# Patient Record
Sex: FEMALE
[De-identification: names, ages, dates, MRNs, and addresses within clinical notes are randomized per-mention and may not be internally consistent; named-entity substitution may affect disease eponyms.]

---

## 2020-07-23 PROBLEM — Z00.00 ENCOUNTER FOR PREVENTIVE HEALTH EXAMINATION: Status: ACTIVE | Noted: 2020-07-23

## 2020-07-30 ENCOUNTER — APPOINTMENT (OUTPATIENT)
Dept: ORTHOPEDIC SURGERY | Facility: CLINIC | Age: 60
End: 2020-07-30
Payer: COMMERCIAL

## 2020-07-30 ENCOUNTER — OUTPATIENT (OUTPATIENT)
Dept: OUTPATIENT SERVICES | Facility: HOSPITAL | Age: 60
LOS: 1 days | End: 2020-07-30
Payer: COMMERCIAL

## 2020-07-30 VITALS
WEIGHT: 183 LBS | OXYGEN SATURATION: 98 % | HEART RATE: 85 BPM | DIASTOLIC BLOOD PRESSURE: 80 MMHG | BODY MASS INDEX: 33.68 KG/M2 | HEIGHT: 62 IN | SYSTOLIC BLOOD PRESSURE: 112 MMHG

## 2020-07-30 DIAGNOSIS — M76.892 OTHER SPECIFIED ENTHESOPATHIES OF LEFT LOWER LIMB, EXCLUDING FOOT: ICD-10-CM

## 2020-07-30 DIAGNOSIS — Z86.19 PERSONAL HISTORY OF OTHER INFECTIOUS AND PARASITIC DISEASES: ICD-10-CM

## 2020-07-30 DIAGNOSIS — R73.03 PREDIABETES.: ICD-10-CM

## 2020-07-30 DIAGNOSIS — Z82.61 FAMILY HISTORY OF ARTHRITIS: ICD-10-CM

## 2020-07-30 PROCEDURE — 73564 X-RAY EXAM KNEE 4 OR MORE: CPT

## 2020-07-30 PROCEDURE — 73564 X-RAY EXAM KNEE 4 OR MORE: CPT | Mod: 26,LT,RT

## 2020-07-30 PROCEDURE — 99204 OFFICE O/P NEW MOD 45 MIN: CPT

## 2020-07-30 RX ORDER — CELECOXIB 100 MG/1
100 CAPSULE ORAL TWICE DAILY
Qty: 60 | Refills: 1 | Status: ACTIVE | COMMUNITY
Start: 2020-07-30 | End: 1900-01-01

## 2020-07-30 RX ORDER — OMEPRAZOLE 40 MG/1
40 CAPSULE, DELAYED RELEASE ORAL
Refills: 0 | Status: ACTIVE | COMMUNITY

## 2020-07-30 RX ORDER — DICLOFENAC SODIUM 10 MG/G
1 GEL TOPICAL
Refills: 0 | Status: ACTIVE | COMMUNITY

## 2020-07-30 RX ORDER — CHROMIUM 200 MCG
TABLET ORAL
Refills: 0 | Status: ACTIVE | COMMUNITY

## 2020-07-30 NOTE — DISCUSSION/SUMMARY
[Medication Risks Reviewed] : Medication risks reviewed [de-identified] : The patient is a 69 year old woman with history of H. pylori infection, gastritis presenting with a one month history of atraumatic, left knee pain.  Her symptoms are consistent with mild, bilateral knee osteoarthritis, and left knee distal quadriceps tendinitis.\par \par Imaging/Diagnostics were interpreted and results were reviewed with the patient in detail.  All questions were answered appropriately.\par \par The patient was counseled on the natural progression of the problem today.  The patient was provided reassurance today.\par \par Patient was prescribed a course of physical therapy today.  The patient was also provided some general home exercises.  The patient was counseled on activity modification.\par \par Patient was prescribed a short course of Celecoxib .Appropriate use of medication was reviewed with the patient in detail. Risks, benefits, and adverse effects medication were discussed.  It is medically necessary to prescript OSBORN-2 selective NSAID given history of gastritis.  Patient instructed to continue to take PPI, Omeprazole.\par \par Follow-up in 4-6 weeks after PT.  If symptoms persist, will consider MRI vs. injections - cortisone vs. HA.\par \par \par ------------------------------------------------------------------------------------------------------------------\par Patient appreciates and agrees with current plan.\par \par This note was generated using a mixture of manual typing and dragon medical dictation software.  A reasonable effort has been made for proofreading its contents, but typos may still remain.  If there are any questions or points of clarification needed please notify my office.\par \par >45 minutes of time was spent on total encounter.  >50% of the visit was spent on counseling/coordination of care and medical-decision making for this patient.\par

## 2020-07-30 NOTE — HISTORY OF PRESENT ILLNESS
[8] : a current pain level of 8/10 [de-identified] : The patient is a 69 year old woman with history of H. pylori infection, gastritic, presenting with left knee pain.\par \par The patient presents with a one month history of atraumatic, left anteromedial knee pain.  The patient denies precipitating injury or trauma.  She works as a home attendant.  She does not recall starting a new activity or changing shoewear.  She has occasional clicking sensation on the anterior left knee, but otherwise denies mechanical symptoms.  She endorses mild swelling.  She denies history of gout or other rheumatologic conditions.  She also endorses mild right medial knee pain.  She denies focal hip or low back pain.  She initially saw her PMD who prescribed Voltaren gel which has provided minimal relief of pain.  \par \par Pain is rated 8/10, described as achy, improved with rest, worse with walking/descending stairs.

## 2020-07-30 NOTE — PHYSICAL EXAM
[de-identified] : Xray Bilateral Knees - Multiple views were reviewed with the patient in detail.  There is no acute fracture or dislocation.  There is mild medial joint space narrowing bilaterally.  There is left knee effusion.\par  [de-identified] : General: Well-nourished, well-developed, alert, and in no acute distress, OBESE\par Head: Normocephalic.\par Eyes: Pupils equal round reactive to light and accommodation, extraocular muscles intact, normal sclera.\par Nose: No nasal discharge.\par Cardiac: Regular rate. Extremities are warm and well perfused. Distal pulses are symmetric bilaterally.\par Respiratory: No labored breathing.\par Extremities: Sensation is intact distally bilaterally.  Distal pulses are symmetric bilaterally\par Lymphatic: No regional lymphadenopathy, no lymphedema\par Neurologic: No focal deficits\par Skin: Normal skin color, texture, and turgor\par Psychiatric: Normal affect\par MSK: as noted above/below\par \par \par \par RIGHT KNEE:\par \par Inspection: no bruising, swelling, erythema\par Joint Effusion:no \par ROM: Knee Flexion 130 , Knee Extension 0\par Palpation:MEDIAL JOINT LINE PAIN, No pain at joint line, patellar tendon, MFC/LFC, Medial/Lateral Tibial Plateau\par Leg Length Discrepancy:no\par Patella: no apprehension\par Distal Pulses: normal\par Lower Extremity Strength:normal, 5/5 \par Lower Extremity Reflexes:normal, 2+\par Lower Extremity Sensation: normal\par \par Special Tests:\par Sheron:Negative \par [place]an[place]: Negative\par Anterior Drawer:Negative\par Posterior Drawer:Negative \par Varus/Valgus:Negative, no instability\par \par LEFT KNEE:\par \par Inspection: no bruising, TRACE, erythema\par Joint Effusion:TRACE \par ROM: Knee Flexion 130 WITH PAIN AT END FLEXION , Knee Extension 0\par Palpation:PAIN AT MEDIAL JOINT LINE, PAIN AT PATELLAR FACET, PAIN AT DISTAL QUAD WITHOUT PALPABLE DEFECT, No pain at patellar tendon, MFC/LFC, Medial/Lateral Tibial Plateau\par Leg Length Discrepancy:no\par Patella: no apprehension\par Distal Pulses: normal\par Lower Extremity Strength:KNEE EXTENSION 5-/5, KNEE FLEXION 5/5\par Lower Extremity Reflexes:normal, 2+\par Lower Extremity Sensation: normal\par \par Special Tests:\par [facility]:MILDLY POSITIVE MEDIALLY\par [place]: Negative\par Anterior Drawer:Negative\par Posterior Drawer:Negative \par Varus/Valgus:Negative, no instability\par

## 2020-08-13 ENCOUNTER — APPOINTMENT (OUTPATIENT)
Dept: ORTHOPEDIC SURGERY | Facility: CLINIC | Age: 60
End: 2020-08-13
Payer: COMMERCIAL

## 2020-08-13 ENCOUNTER — RESULT REVIEW (OUTPATIENT)
Age: 60
End: 2020-08-13

## 2020-08-13 ENCOUNTER — OUTPATIENT (OUTPATIENT)
Dept: OUTPATIENT SERVICES | Facility: HOSPITAL | Age: 60
LOS: 1 days | End: 2020-08-13
Payer: COMMERCIAL

## 2020-08-13 PROCEDURE — 20550 NJX 1 TENDON SHEATH/LIGAMENT: CPT | Mod: LT

## 2020-08-13 PROCEDURE — 99213 OFFICE O/P EST LOW 20 MIN: CPT | Mod: 25

## 2020-08-13 PROCEDURE — 73130 X-RAY EXAM OF HAND: CPT

## 2020-08-13 PROCEDURE — 73130 X-RAY EXAM OF HAND: CPT | Mod: 26,LT,RT

## 2020-08-13 NOTE — PROCEDURE
[Injection] : Injection [Left] : on the left.   [Tendon Sheath___] : [unfilled] ganglion cyst [Patient] : patient [Alcohol] : Alcohol [Ethyl Chloride Spray] : ethyl chloride spray was used as a topical anesthetic [Direct] : direct [25] : a 25-gauge [0.5% Bupivacaine___(mL)] : [unfilled] mL 0.5% Bupivacaine [Triamcinolone 40mg/mL___(mL)] : [unfilled] ~UmL of 40mg/mL triamcinolone [Bandage Applied] : a bandage [Tolerated Well] : The patient tolerated the procedure well [None] : None [PRN] : as needed [de-identified] : Trigger Finger

## 2020-08-13 NOTE — PHYSICAL EXAM
[de-identified] : General: AAOx3, NAD\par L Hand: No swelling, erythema, or brusing noted. Skin intact.\par Moderate TTP over 3rd volar MCPJ\par Decreased flexion of 3rd digit compared to others\par AIN/PIN/U 5/5 \par Sensation intact M/R/U\par 2+ radial pulses [de-identified] : XR of bilateral hands show no evidence of fractures. Mild OA of the DIP joints bilaterally.

## 2020-08-13 NOTE — ASSESSMENT
[FreeTextEntry1] : 69F with PMHx of prediabetes and knee OA presents with 1 year of worsening L 3rd finger pain and clicking.\par - Symptoms likely 2/2 Trigger finger\par - Pt received injection of L 3rd flexor tendon sheath with lidocaine/kenalog\par - Tolerated procedure well\par - Can RTC as needed for follow-up if symptoms persist or worsen

## 2020-08-27 ENCOUNTER — APPOINTMENT (OUTPATIENT)
Dept: ORTHOPEDIC SURGERY | Facility: CLINIC | Age: 60
End: 2020-08-27
Payer: COMMERCIAL

## 2020-08-27 VITALS — TEMPERATURE: 97.3 F

## 2020-08-27 DIAGNOSIS — M22.2X2 PATELLOFEMORAL DISORDERS, LEFT KNEE: ICD-10-CM

## 2020-08-27 PROCEDURE — 99213 OFFICE O/P EST LOW 20 MIN: CPT

## 2020-08-27 NOTE — PHYSICAL EXAM
[de-identified] : General: Well-nourished, well-developed, alert, and in no acute distress, OBESE\par Head: Normocephalic.\par Eyes: Pupils equal round reactive to light and accommodation, extraocular muscles intact, normal sclera.\par Nose: No nasal discharge.\par Cardiac: Regular rate. Extremities are warm and well perfused. Distal pulses are symmetric bilaterally.\par Respiratory: No labored breathing.\par Extremities: Sensation is intact distally bilaterally.  Distal pulses are symmetric bilaterally\par Lymphatic: No regional lymphadenopathy, no lymphedema\par Neurologic: No focal deficits\par Skin: Normal skin color, texture, and turgor\par Psychiatric: Normal affect\par MSK: as noted above/below\par \par \par \par RIGHT KNEE:\par \par Inspection: no bruising, swelling, erythema\par Joint Effusion:no \par ROM: Knee Flexion 130 , Knee Extension 0\par Palpation:MEDIAL JOINT LINE PAIN, No pain at joint line, patellar tendon, MFC/LFC, Medial/Lateral Tibial Plateau\par Leg Length Discrepancy:no\par Patella: no apprehension\par Distal Pulses: normal\par Lower Extremity Strength:normal, 5/5 \par Lower Extremity Reflexes:normal, 2+\par Lower Extremity Sensation: normal\par \par Special Tests:\par Sheron:Negative \par [place]: Negative\par Anterior Drawer:Negative\par Posterior Drawer:Negative \par Varus/Valgus:Negative, no instability\par \par LEFT KNEE:\par \par Inspection: no bruising, TRACE, erythema\par Joint Effusion:TRACE \par ROM: Knee Flexion 130 WITH PAIN AT END FLEXION , Knee Extension 0\par Palpation:PAIN AT MEDIAL JOINT LINE, PAIN AT PATELLAR FACET, PAIN AT DISTAL QUAD WITHOUT PALPABLE DEFECT, No pain at patellar tendon, MFC/LFC, Medial/Lateral Tibial Plateau\par Leg Length Discrepancy:no\par Patella: no apprehension\par Distal Pulses: normal\par Lower Extremity Strength:KNEE EXTENSION 5-/5, KNEE FLEXION 5/5\par Lower Extremity Reflexes:normal, 2+\par Lower Extremity Sensation: normal\par \par Special Tests:\par [facility]:MILDLY POSITIVE MEDIALLY\par [place]: Negative\par Anterior Drawer:Negative\par Posterior Drawer:Negative \par Varus/Valgus:Negative, no instability\par

## 2020-08-27 NOTE — DISCUSSION/SUMMARY
[Medication Risks Reviewed] : Medication risks reviewed [de-identified] : The patient is a 69 year old woman with history of H. pylori infection, gastritis presenting with a one month history of atraumatic, left knee pain.  Her symptoms are consistent with mild, bilateral knee osteoarthritis, and left knee patellofemoral pain.\par \par Patient given another prescription for physical therapy.  She was instructed to call her insurance to determine locations that would be covered by her insurance.\par \par She was given prescription for patellofemoral stabilizer brace.\par \par She defers injection therapy at this point.\par \par She was counseled on importance of weight loss.\par \par Follow-up in 6-8 weeks after PT.\par \par ------------------------------------------------------------------------------------------------------------------\par Patient appreciates and agrees with current plan.\par \par This note was generated using a mixture of manual typing and dragon medical dictation software.  A reasonable effort has been made for proofreading its contents, but typos may still remain.  If there are any questions or points of clarification needed please notify my office.\par \par >15 minutes of time was spent on total encounter.  >50% of the visit was spent on counseling/coordination of care and medical-decision making for this patient.\par

## 2020-08-27 NOTE — HISTORY OF PRESENT ILLNESS
[de-identified] : The patient is a 69 year old woman with history of H. pylori infection, gastritic, presenting for follow-up for left knee pain.\par \par The patient was last seen about one month ago for a one month history of atraumatic, left anteromedial knee pain.  Her pain was attributed to knee osteoarthritis and mild distal quadriceps tendinitis.  She initially saw her PMD who prescribed Voltaren gel which has provided minimal relief of pain.  We discussed a course of Celebrex and physical therapy.  She did not take the Celebrex.  She went to 2 PT sessions, but apparently the facility was not covered by her insurance, so she stopped going.  Pain is similar to initial visit.  The patient denies significant swelling or bruising.\par \par Pain is rated 8/10, described as achy, improved with rest, worse with walking/descending stairs.

## 2020-10-22 ENCOUNTER — APPOINTMENT (OUTPATIENT)
Dept: ORTHOPEDIC SURGERY | Facility: CLINIC | Age: 60
End: 2020-10-22
Payer: COMMERCIAL

## 2020-10-22 VITALS
HEIGHT: 62 IN | OXYGEN SATURATION: 98 % | DIASTOLIC BLOOD PRESSURE: 84 MMHG | BODY MASS INDEX: 33.68 KG/M2 | WEIGHT: 183 LBS | SYSTOLIC BLOOD PRESSURE: 118 MMHG | HEART RATE: 102 BPM

## 2020-10-22 DIAGNOSIS — G57.63 LESION OF PLANTAR NERVE, BILATERAL LOWER LIMBS: ICD-10-CM

## 2020-10-22 DIAGNOSIS — M17.0 BILATERAL PRIMARY OSTEOARTHRITIS OF KNEE: ICD-10-CM

## 2020-10-22 PROCEDURE — 99213 OFFICE O/P EST LOW 20 MIN: CPT

## 2020-10-22 PROCEDURE — 99072 ADDL SUPL MATRL&STAF TM PHE: CPT

## 2020-10-22 NOTE — PHYSICAL EXAM
[de-identified] : General: Well-nourished, well-developed, alert, and in no acute distress, OBESE\par Head: Normocephalic.\par Eyes: Pupils equal round reactive to light and accommodation, extraocular muscles intact, normal sclera.\par Nose: No nasal discharge.\par Cardiac: Regular rate. Extremities are warm and well perfused. Distal pulses are symmetric bilaterally.\par Respiratory: No labored breathing.\par Extremities: Sensation is intact distally bilaterally.  Distal pulses are symmetric bilaterally\par Lymphatic: No regional lymphadenopathy, no lymphedema\par Neurologic: No focal deficits\par Skin: Normal skin color, texture, and turgor\par Psychiatric: Normal affect\par MSK: as noted above/below\par \par \par \par RIGHT KNEE:\par \par Inspection: no bruising, swelling, erythema\par Joint Effusion:no \par ROM: Knee Flexion 130 , Knee Extension 0\par Palpation: No pain at joint line, patellar tendon, MFC/LFC, Medial/Lateral Tibial Plateau\par Leg Length Discrepancy:no\par Patella: no apprehension\par Distal Pulses: normal\par Lower Extremity Strength:normal, 5/5 \par Lower Extremity Reflexes:normal, 2+\par Lower Extremity Sensation: normal\par \par Special Tests:\par Sheron:Negative \par [place]: Negative\par Anterior Drawer:Negative\par Posterior Drawer:Negative \par Varus/Valgus:Negative, no instability\par \par LEFT KNEE:\par \par Inspection: no bruising, TRACE, erythema\par Joint Effusion:TRACE \par ROM: Knee Flexion 130  Knee Extension 0\par Palpation:NO JOINT LINE PAIN, NO QUAD PAIN, No pain at patellar tendon, MFC/LFC, Medial/Lateral Tibial Plateau\par Leg Length Discrepancy:no\par Patella: no apprehension\par Distal Pulses: normal\par Lower Extremity Strength:KNEE EXTENSION 5-/5, KNEE FLEXION 5/5\par Lower Extremity Reflexes:normal, 2+\par Lower Extremity Sensation: normal\par \par Special Tests:\par Sheron:NEGATIVE\par [place]: Negative\par Anterior Drawer:Negative\par Posterior Drawer:Negative \par Varus/Valgus:Negative, no instability\par \par RIGHT Foot:\par \par Inspection: NO swelling, ecchymosis,gross deformity\par Palpation: NO pain at 5th metatarsal base, pain at cuboid, pain at 1st MTP, pain at dorsum & plantar surfaces, pain at calcaneus, pain at retrocalcaneal bursa, and subcutaneous calcaneal bursa, pain at calcaneal tuberosity,pain at plantar fascia, pain at metatarsal, pain at phalanges\par ROM: Full Dorsiflexion, Plantarflexion, Eversion, Inversion, Motion at Subtalar joint, Supination, Pronation\par Strength: 5/5\par Neurovascular: normal distal pulses, normal sensation\par \par LEFT Foot:\par \par Inspection: NO swelling, ecchymosis,gross deformity\par Palpation: NO pain at 5th metatarsal base, pain at cuboid, pain at 1st MTP, pain at dorsum & plantar surfaces, pain at calcaneus, pain at retrocalcaneal bursa, and subcutaneous calcaneal bursa, pain at calcaneal tuberosity,pain at plantar fascia, pain at metatarsal, pain at phalanges\par ROM: Full Dorsiflexion, Plantarflexion, Eversion, Inversion, Motion at Subtalar joint, Supination, Pronation\par Strength: 5/5\par Neurovascular: normal distal pulses, normal sensation\par \par \par

## 2020-10-22 NOTE — HISTORY OF PRESENT ILLNESS
[de-identified] : The patient is a 69 year old woman with history of H. pylori infection, gastritic, presenting for follow-up for left knee pain.\par \par The patient was last seen about two months ago for a chronic atraumatic, left anteromedial knee pain.  Her pain was attributed to knee osteoarthritis and mild distal quadriceps tendinitis.  She has tried voltaren gel, celebrex and PT.   She has been going to supervised PT, and her symptoms have essentially resolved.  The patient denies significant swelling or bruising.  The patient denies mechanical symptoms including catching, locking, buckling.\par \par She also complains of occasional, bilateral pain between 3rd and 4th toes bilaterally.  Pain worse with tight shoes and prolonged walking.\par \par

## 2020-10-22 NOTE — DISCUSSION/SUMMARY
[Medication Risks Reviewed] : Medication risks reviewed [de-identified] : The patient is a 69 year old woman with history of H. pylori infection, gastritis presenting with a several month history of chronic, atraumatic, left knee pain.  Her symptoms are consistent with mild, bilateral knee osteoarthritis, and left knee patellofemoral pain.  Her symptoms have improved significantly with physical therapy.\par \par Continue HEP/PT.  Counseled on continued weight loss.\par \par She also complains of bilateral, occasional forefoot pain between her digits.  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  We discussed using footwear with wide toebox.  She can consider using metatarsal pads.\par \par Follow-up in 3-6 months or as needed.\par \par ------------------------------------------------------------------------------------------------------------------\par Patient appreciates and agrees with current plan.\par \par This note was generated using a mixture of manual typing and dragon medical dictation software.  A reasonable effort has been made for proofreading its contents, but typos may still remain.  If there are any questions or points of clarification needed please notify my office.\par \par >15 minutes of time was spent on total encounter.  >50% of the visit was spent on counseling/coordination of care and medical-decision making for this patient.\par

## 2021-02-08 ENCOUNTER — APPOINTMENT (OUTPATIENT)
Dept: PHYSICAL MEDICINE AND REHAB | Facility: CLINIC | Age: 61
End: 2021-02-08